# Patient Record
Sex: FEMALE | Race: WHITE | URBAN - METROPOLITAN AREA
[De-identification: names, ages, dates, MRNs, and addresses within clinical notes are randomized per-mention and may not be internally consistent; named-entity substitution may affect disease eponyms.]

---

## 2021-12-02 ENCOUNTER — APPOINTMENT (RX ONLY)
Dept: URBAN - METROPOLITAN AREA CLINIC 12 | Facility: CLINIC | Age: 76
Setting detail: DERMATOLOGY
End: 2021-12-02

## 2021-12-02 DIAGNOSIS — Z41.9 ENCOUNTER FOR PROCEDURE FOR PURPOSES OTHER THAN REMEDYING HEALTH STATE, UNSPECIFIED: ICD-10-CM

## 2021-12-02 PROCEDURE — ? BOTOX

## 2021-12-02 PROCEDURE — ? TEOSYAL RHA 4 INJECTION

## 2021-12-02 NOTE — PROCEDURE: BOTOX
Additional Area 3 Location: gummy smile
Platsyma Units: 0
Lot #: [id number]
Additional Area 2 Location: masseter
Expiration Date (Month Year): 2/24
Consent: Written consent was obtained prior to the procedure. Risks, benefits, expectations and alternatives were discussed including, but not limited to, infection, bleeding, lid/brow ptosis, bruising, swelling, diplopia, temporary effects, incomplete chemical denervation and dissatisfaction with the cosmetic outcome. No guarantee or warranty was given or implied regarding longevity of results.
Map Statment: See attached map for complete details
Summary Price $ (Use Numbers Only, No Text Please.): 560
Postcare Instructions: Patient instructed to not lie down for 4 hours and limit physical activity for 24 hours. Patient instructed not to travel by airplane for 48 hours.
Additional Area 1 Location: chin
Price Per Unit In $ (Use Numbers Only, No Text Please.): 14
Administered By (Optional): ZORA Shultz
Dilution (U/0.1 Cc): 5
Periorbital Skin Units: 15
Glabellar Complex Units: 20
Use Map Statement For Sites (Optional): No
Show Price In Note?: yes
Detail Level: Detailed
Bill Summary Price Listed Below, Or Bill Total Of Units X Price Per Unit?: Bill Summary Price Below

## 2021-12-02 NOTE — PROCEDURE: TEOSYAL RHA 4 INJECTION
Expiration Date (Month Year): 3/15/24
Anesthesia Volume In Cc: 0.5
Detail Level: Detailed
Number Of Syringes (Required For Inventory): 2
Post-Care Instructions: Patient instructed to apply ice to reduce swelling.
Additional Area 1 Volume In Cc: 0
Procedural Text: The filler was administered to the treatment areas noted above.
Use Map Statement For Sites (Optional): No
Filler: Teosyal RHA 4
Lot #: [id number]
Anesthesia Type: 1% lidocaine with epinephrine
Consent: Written consent obtained. Risks include but not limited to bruising, beading, irregular texture, ulceration, infection, allergic reaction, scar formation, incomplete augmentation, temporary nature, procedural pain.
Price (Use Numbers Only, No Special Characters Or $): 8788
Map Statment: See Attach Map for Complete Details
Additional Anesthesia Volume In Cc: 6